# Patient Record
Sex: FEMALE | URBAN - NONMETROPOLITAN AREA
[De-identification: names, ages, dates, MRNs, and addresses within clinical notes are randomized per-mention and may not be internally consistent; named-entity substitution may affect disease eponyms.]

---

## 2020-07-30 ENCOUNTER — PROCEDURE VISIT (OUTPATIENT)
Dept: NEUROLOGY | Age: 60
End: 2020-07-30
Payer: COMMERCIAL

## 2020-07-30 PROCEDURE — 95886 MUSC TEST DONE W/N TEST COMP: CPT | Performed by: PSYCHIATRY & NEUROLOGY

## 2020-07-30 PROCEDURE — 95910 NRV CNDJ TEST 7-8 STUDIES: CPT | Performed by: PSYCHIATRY & NEUROLOGY

## 2023-02-14 ENCOUNTER — OFFICE VISIT (OUTPATIENT)
Dept: RHEUMATOLOGY | Age: 63
End: 2023-02-14
Payer: COMMERCIAL

## 2023-02-14 VITALS
DIASTOLIC BLOOD PRESSURE: 76 MMHG | BODY MASS INDEX: 35.16 KG/M2 | HEIGHT: 65 IN | WEIGHT: 211 LBS | OXYGEN SATURATION: 95 % | SYSTOLIC BLOOD PRESSURE: 126 MMHG | HEART RATE: 53 BPM

## 2023-02-14 DIAGNOSIS — M05.9 SEROPOSITIVE RHEUMATOID ARTHRITIS (HCC): Primary | ICD-10-CM

## 2023-02-14 DIAGNOSIS — Z79.899 ENCOUNTER FOR LONG-TERM (CURRENT) USE OF HIGH-RISK MEDICATION: ICD-10-CM

## 2023-02-14 PROCEDURE — 99204 OFFICE O/P NEW MOD 45 MIN: CPT | Performed by: INTERNAL MEDICINE

## 2023-02-14 RX ORDER — BACLOFEN 10 MG/1
10 TABLET ORAL 3 TIMES DAILY
COMMUNITY

## 2023-02-14 RX ORDER — PREDNISONE 1 MG/1
TABLET ORAL
Qty: 30 TABLET | Refills: 0 | Status: SHIPPED | OUTPATIENT
Start: 2023-02-14 | End: 2023-03-01

## 2023-02-14 RX ORDER — LEFLUNOMIDE 20 MG/1
20 TABLET ORAL DAILY
Qty: 30 TABLET | Refills: 1 | Status: SHIPPED | OUTPATIENT
Start: 2023-02-14 | End: 2023-03-16

## 2023-02-14 RX ORDER — METOPROLOL TARTRATE 50 MG/1
50 TABLET, FILM COATED ORAL 2 TIMES DAILY
COMMUNITY

## 2023-02-14 RX ORDER — PROPAFENONE HYDROCHLORIDE 150 MG/1
150 TABLET, FILM COATED ORAL EVERY 8 HOURS
COMMUNITY

## 2023-02-14 RX ORDER — ROPINIROLE 0.25 MG/1
0.25 TABLET, FILM COATED ORAL 3 TIMES DAILY
COMMUNITY

## 2023-02-14 ASSESSMENT — JOINT PAIN
TOTAL NUMBER OF TENDER JOINTS: 14
TOTAL NUMBER OF SWOLLEN JOINTS: 9

## 2023-02-14 ASSESSMENT — ENCOUNTER SYMPTOMS
SHORTNESS OF BREATH: 0
ABDOMINAL PAIN: 0
COUGH: 0
BLOOD IN STOOL: 0
VOMITING: 0
NAUSEA: 0

## 2023-02-14 NOTE — PROGRESS NOTES
[facility]) Physicians   Rheumatology Clinic Note      2/14/2023       Reason for Consult:  rheumatoid arthritis  Requesting Physician:  Tuyet Grimm RN     CHIEF COMPLAINT:    Chief Complaint   Patient presents with    New Patient     New pt pain in joints    Pt stated pain 8/10 all over           HISTORY OF PRESENT ILLNESS:    58 y.o. female presents today for evaluation of joint pain. Reports being tested positive for rheumatoid arthritis. Reports having joint pain for many years. Pain is most pronounced upon awakening in the morning. Swelling in joints. Pain is in the hands, wrist, knees. Has excessive fatigue. Muscle pain. Past Medical History:     has a past medical history of Arthritis. Past Surgical History:     has a past surgical history that includes knee surgery. Current Medications:      Current Outpatient Medications:     rOPINIRole (REQUIP) 0.25 MG tablet, Take 0.25 mg by mouth 3 times daily, Disp: , Rfl:     baclofen (LIORESAL) 10 MG tablet, Take 10 mg by mouth 3 times daily, Disp: , Rfl:     propafenone (RYTHMOL) 150 MG tablet, Take 150 mg by mouth in the morning and 150 mg at noon and 150 mg in the evening., Disp: , Rfl:     metoprolol tartrate (LOPRESSOR) 50 MG tablet, Take 50 mg by mouth 2 times daily, Disp: , Rfl:     leflunomide (ARAVA) 20 MG tablet, Take 1 tablet by mouth daily, Disp: 30 tablet, Rfl: 1    predniSONE (DELTASONE) 5 MG tablet, Take 3 tablets by mouth daily for 5 days, THEN 2 tablets daily for 5 days, THEN 1 tablet daily for 5 days. , Disp: 30 tablet, Rfl: 0    Allergies:    Patient has no known allergies. Social History:     reports that she quit smoking about 13 months ago. Her smoking use included cigarettes. She has never used smokeless tobacco. She reports that she does not currently use alcohol. Family History:   family history is not on file. REVIEW OF SYSTEMS:    Review of Systems   Constitutional:  Positive for fatigue.  Negative for chills, fever and unexpected weight change. HENT:  Negative for mouth sores. Respiratory:  Negative for cough and shortness of breath. Cardiovascular:  Negative for chest pain and leg swelling. Gastrointestinal:  Negative for abdominal pain, blood in stool, nausea and vomiting. Skin:  Negative for rash. Neurological:  Negative for headaches. PHYSICAL EXAM:    Vitals:    /76 (Site: Left Upper Arm, Position: Sitting, Cuff Size: Large Adult)   Pulse 53   Ht 5' 5\" (1.651 m)   Wt 211 lb (95.7 kg)   SpO2 95%   BMI 35.11 kg/m²     Physical Exam  Constitutional:       General: She is not in acute distress. Appearance: Normal appearance. HENT:      Mouth/Throat:      Mouth: Mucous membranes are moist.      Pharynx: Oropharynx is clear. Eyes:      Conjunctiva/sclera: Conjunctivae normal.   Cardiovascular:      Rate and Rhythm: Normal rate and regular rhythm. Heart sounds: Normal heart sounds. No murmur heard. No friction rub. Pulmonary:      Effort: Pulmonary effort is normal. No respiratory distress. Breath sounds: Normal breath sounds. No wheezing or rhonchi. Musculoskeletal:         General: Swelling and tenderness present. No deformity. Normal range of motion. Cervical back: Normal range of motion and neck supple. Right lower leg: No edema. Left lower leg: No edema. Lymphadenopathy:      Cervical: No cervical adenopathy. Skin:     General: Skin is warm and dry. Findings: No lesion or rash. Neurological:      General: No focal deficit present. Mental Status: She is alert and oriented to person, place, and time.       Gait: Gait normal.   Psychiatric:         Mood and Affect: Mood normal.      Physical Exam        LOO-28 (ESR): 5.37 (High disease activity)  LOO-28 (CRP): 4.45 (Moderate disease activity)         DATA:    RHEUMATOID FACTOR 0 - 13 IU/mL 320 High     Resulting [facility] [place] [facility] [place]   Specimen Collected: 02/03/23 10:37 Last Resulted: 02/04/23 19:54      Ref Range & Units 11 d ago Comments   Cyclic Citrullinated Peptide Ab, IgG 0.0 - 7.0 U/mL > 340.0 High   Reference Range:       <7.0  Negative   7.0-10.0  Equivocal      >10.0  Positive     [facility] [address] [facility], [address] [place] [address] ([address]    Specimen Collected: 02/03/23 10:37 Last Resulted: 02/08/23 15:06      Ref Range & Units 11 d ago   ESR (SED RATE, ERYTHROCYTE SEDIMENTATION RATE) 0 - 30 mm/hr 27    Resulting Agency  [address] [facility] [place]   Specimen Collected: 02/03/23 10:37 Last Resulted: 02/03/23 14:01     Ref Range & Units 11 d ago    C-REACTIVE PROTEIN 0.0 - 1.0 mg/dL 2.3 High     Resulting Agency  [facility]     FROILAN, IFA None Detected None Detected      Ref Range & Units 11 d ago Comments    SODIUM 136 - 145 mEq/L 137     POTASSIUM 3.5 - 5.1 mEq/L 4.5     CHLORIDE 98 - 107 mEq/L 106     CARBON DIOXIDE (CO2) 22 - 31 mEq/L 25     Glucose 70 - 126 mg/dL 92     BUN 7 - 22 mg/dL 20     CREATININE SERUM 0.4 - 1.1 mg/dL 0.7     ESTIMATED GFR  >=60      CALCIUM 8.4 - 10.2 mg/dL 8.8     AST 10 - 42 U/L 23     ALT 7 - 35 U/L 28     ALKALINE PHOSPHATASE 38 - 126 U/L 79     BILIRUBIN, TOTAL 0.3 - 1.2 mg/dL 0.2 Low      PROTEIN, TOTAL 6.4 - 8.3 g/dL 6.3 Low      Albumin 3.5 - 5.0 g/dL 3.5     Globulin 2.9 - 3.3 g/dL 2.8 Low      A/G Ratio 1.2 - 1.5 1.3     Resulting Agency  [address] [facility] [place]    Specimen Collected: 02/03/23 10:37 Last Resulted: 02/03/23 13:07      Ref Range & Units 11 d ago   WBC (WHITE BLOOD COUNT) 4.8 - 10.8 thou/cumm 8.3    RBC 4.20 - 5.40 mil/cumm 4.26    HEMOGLOBIN (HGB) 12.0 - 16.0 g/dL 12.7    HEMATOCRIT (HCT) 37.0 - 47.0 % 36.3 Low     MEAN CELL VOLUME 81.0 - 99.0 fL 85.1    Mean Cell HGB 28.0 - 32.0 pg 29.7    MEAN CELL HGB CONCENTRATION 33.0 - 37.0 g/dL 35.0    RBC DISTRIBUTION 11.5 - 14.5 % 13.6    PLATELET COUNT 247 - 400 thou/cumm 318    NEUTROPHILS 39.4 - 72.5 % 55.1    LYMPHOCYTE 17.6 - 49.6 % 31.2    MONOCYTES: 4.1 - 12.4 % 9.2    EOSINOPHIL % 0.0 - 4.0 % 3.0    BASOPHIL % 0.0 - 3.0 % 1.5    SCAN SLIDE NO NO    Resulting Agency  [facility] [place]           IMPRESSION/RECOMMENDATIONS:      1. Seropositive rheumatoid arthritis (+RF, +CCP): with high disease activity. -- start leflunomide 20 mg daily. Discussed with pt the benefits, risks and adverse effects of this medication. Patient expressed understanding.  -- prednsione taper as bridging therapy. 30 mg daily x 5 days, then 20 mg daily x 5 days, then 10 mg daily x 5 days then stop    2.  High risk med requiring close monitoring:  -- LEF: monitor CBC and CMP every 4-6 weeks    Rtc in 6 weeks with labs        Orders Placed This Encounter   Procedures    CBC with Auto Differential     Standing Status:   Future     Standing Expiration Date:   8/14/2023    C-Reactive Protein     Standing Status:   Future     Standing Expiration Date:   2/14/2024    Sedimentation Rate     Standing Status:   Future     Standing Expiration Date:   2/14/2024    Comprehensive Metabolic Panel     Standing Status:   Future     Standing Expiration Date:   2/14/2024        Leopold Baas, MD    [address] [facility] [address] [place] [address]. [address]  [phone]

## 2023-03-09 DIAGNOSIS — Z79.899 ENCOUNTER FOR LONG-TERM (CURRENT) USE OF HIGH-RISK MEDICATION: ICD-10-CM

## 2023-03-09 DIAGNOSIS — M05.9 SEROPOSITIVE RHEUMATOID ARTHRITIS (HCC): ICD-10-CM

## 2023-03-09 RX ORDER — LEFLUNOMIDE 20 MG/1
TABLET ORAL
Qty: 30 TABLET | Refills: 1 | OUTPATIENT
Start: 2023-03-09

## 2023-03-22 LAB
ALBUMIN SERPL-MCNC: 3.8 G/DL (ref 3.5–5)
ALBUMIN/GLOBULIN RATIO: 1.2 (ref 1.2–1.5)
ALK PHOSPHATASE: 107 U/L (ref 38–126)
ALT SERPL-CCNC: 77 U/L (ref 7–35)
AST SERPL-CCNC: 53 U/L (ref 10–42)
BASOPHILS # BLD: 1 % (ref 0–3)
BILIRUB SERPL-MCNC: 0.6 MG/DL (ref 0.3–1.2)
BUN BLDV-MCNC: 13 MG/DL (ref 7–22)
C-REACTIVE PROTEIN: 1.3 MG/DL (ref 0–1)
CALCIUM SERPL-MCNC: 9.2 MG/DL (ref 8.4–10.2)
CHLORIDE BLD-SCNC: 98 MEQ/L (ref 98–107)
CO2: 28 MEQ/L (ref 22–31)
CREAT SERPL-MCNC: 0.8 MG/DL (ref 0.4–1.1)
EOSINOPHIL # BLD: 2.4 % (ref 0–4)
ERYTHROCYTE SEDIMENTATION RATE: 12 MM/HR (ref 0–30)
GFR SERPL CREATININE-BSD FRML MDRD: >=60 ML/MIN/{1.73_M2}
GLOBULIN: 3.3 G/DL (ref 2.9–3.3)
GLUCOSE: 99 MG/DL (ref 70–126)
HCT VFR BLD CALC: 43.1 % (ref 37–47)
HEMOGLOBIN: 14.9 G/DL (ref 12–16)
LYMPHOCYTES # BLD: 31.6 % (ref 17.6–49.6)
MCH RBC QN AUTO: 29.7 PG (ref 28–32)
MCHC RBC AUTO-ENTMCNC: 34.6 G/DL (ref 33–37)
MCV RBC AUTO: 85.9 FL (ref 81–99)
MONOCYTES # BLD: 10.8 % (ref 4.1–12.4)
PDW BLD-RTO: 13.7 % (ref 11.5–14.5)
PLATELET # BLD: 329 THOU/CUMM (ref 130–400)
POTASSIUM SERPL-SCNC: 3.7 MEQ/L (ref 3.5–5.1)
RBC: 5.01 MIL/CUMM (ref 4.2–5.4)
SCAN OF BLOOD SMEAR: NO
SEG NEUTROPHILS: 54.2 % (ref 39.4–72.5)
SODIUM BLD-SCNC: 136 MEQ/L (ref 136–145)
TOTAL PROTEIN: 7.1 G/DL (ref 6.4–8.3)
WBC: 8.3 THOU/CUMM (ref 4.8–10.8)

## 2023-03-23 ENCOUNTER — TELEPHONE (OUTPATIENT)
Dept: RHEUMATOLOGY | Age: 63
End: 2023-03-23

## 2023-03-23 DIAGNOSIS — Z11.1 SCREENING-PULMONARY TB: Primary | ICD-10-CM

## 2023-03-23 DIAGNOSIS — Z11.59 NEED FOR HEPATITIS B SCREENING TEST: ICD-10-CM

## 2023-03-23 NOTE — TELEPHONE ENCOUNTER
Please instruct pt to stop taking leflunomide. The labs that were done earlier showed high liver enzymes. I suspect this is related to the medication. We plan to monitor the liver enzymes and expect it to improve after stopping the offending medication. Please also inform pt that I would like to get some more blood tests prior to her upcoming visit. We need these test results in order to start other medications to treat rheumatoid arthritis that does not cause liver problems. Orders are in the chart. Thank you.

## 2023-03-28 ENCOUNTER — OFFICE VISIT (OUTPATIENT)
Dept: RHEUMATOLOGY | Age: 63
End: 2023-03-28
Payer: COMMERCIAL

## 2023-03-28 VITALS
DIASTOLIC BLOOD PRESSURE: 80 MMHG | BODY MASS INDEX: 35.15 KG/M2 | WEIGHT: 210.98 LBS | HEART RATE: 65 BPM | HEIGHT: 65 IN | SYSTOLIC BLOOD PRESSURE: 120 MMHG | OXYGEN SATURATION: 96 %

## 2023-03-28 DIAGNOSIS — R79.89 ELEVATED LFTS: ICD-10-CM

## 2023-03-28 DIAGNOSIS — Z79.899 ENCOUNTER FOR LONG-TERM (CURRENT) USE OF HIGH-RISK MEDICATION: ICD-10-CM

## 2023-03-28 DIAGNOSIS — R29.898 WEAKNESS OF BOTH LOWER EXTREMITIES: ICD-10-CM

## 2023-03-28 DIAGNOSIS — M05.9 SEROPOSITIVE RHEUMATOID ARTHRITIS (HCC): Primary | ICD-10-CM

## 2023-03-28 PROCEDURE — 99214 OFFICE O/P EST MOD 30 MIN: CPT | Performed by: INTERNAL MEDICINE

## 2023-03-28 RX ORDER — MELOXICAM 15 MG/1
TABLET ORAL
COMMUNITY
Start: 2023-03-02

## 2023-03-28 RX ORDER — LANOLIN ALCOHOL/MO/W.PET/CERES
250 CREAM (GRAM) TOPICAL NIGHTLY
COMMUNITY

## 2023-03-28 RX ORDER — METOPROLOL SUCCINATE 50 MG/1
TABLET, EXTENDED RELEASE ORAL
COMMUNITY
Start: 2023-03-07

## 2023-03-28 RX ORDER — APIXABAN 5 MG/1
5 TABLET, FILM COATED ORAL 2 TIMES DAILY
COMMUNITY
Start: 2022-12-19

## 2023-03-28 RX ORDER — MULTIVITAMIN WITH IRON
TABLET ORAL
COMMUNITY

## 2023-03-28 RX ORDER — ALBUTEROL SULFATE 2.5 MG/3ML
SOLUTION RESPIRATORY (INHALATION)
COMMUNITY
Start: 2023-03-23

## 2023-03-28 RX ORDER — HYDROXYCHLOROQUINE SULFATE 200 MG/1
200 TABLET, FILM COATED ORAL DAILY
Qty: 30 TABLET | Refills: 2 | Status: SHIPPED | OUTPATIENT
Start: 2023-03-28 | End: 2023-04-27

## 2023-03-28 RX ORDER — FLUTICASONE FUROATE, UMECLIDINIUM BROMIDE AND VILANTEROL TRIFENATATE 200; 62.5; 25 UG/1; UG/1; UG/1
1 POWDER RESPIRATORY (INHALATION) DAILY
COMMUNITY
Start: 2023-03-13

## 2023-03-28 ASSESSMENT — ENCOUNTER SYMPTOMS
VOMITING: 0
SHORTNESS OF BREATH: 0
ABDOMINAL PAIN: 0
NAUSEA: 0
COUGH: 0

## 2023-03-28 NOTE — PROGRESS NOTES
assess QFT TB and Hep B serologies for possible need to start biologic treatment. 2. Elevated LFTs. Likely related to leflunomide  -- will monitor closely and repeat testing in 5 weeks    3. Weakness in legs.  Possible deconditioning.  -- physical therapy was ordered    RTC in 6 weeks       Orders Placed This Encounter   Procedures    Hepatic Function Panel     Standing Status:   Future     Standing Expiration Date:   3/28/2024    CK     Standing Status:   Future     Standing Expiration Date:   9/28/2023    TSH with Reflex     Standing Status:   Future     Standing Expiration Date:   9/28/2023    Comprehensive Metabolic Panel     Standing Status:   Future     Standing Expiration Date:   3/28/2024    External Referral To Physical Therapy     Referral Priority:   Routine     Referral Type:   Eval and Treat     Requested Specialty:   Physical Therapist     Number of Visits Requested:   900 Madi Pan MD    [address] [facility]. [address] [place] [address]. [address] [facility]  [address] [place] [address]

## 2023-05-08 ENCOUNTER — PATIENT MESSAGE (OUTPATIENT)
Dept: RHEUMATOLOGY | Age: 63
End: 2023-05-08

## 2023-05-09 ENCOUNTER — TELEPHONE (OUTPATIENT)
Dept: RHEUMATOLOGY | Age: 63
End: 2023-05-09

## 2023-05-09 ENCOUNTER — OFFICE VISIT (OUTPATIENT)
Dept: RHEUMATOLOGY | Age: 63
End: 2023-05-09
Payer: COMMERCIAL

## 2023-05-09 VITALS
OXYGEN SATURATION: 96 % | DIASTOLIC BLOOD PRESSURE: 78 MMHG | SYSTOLIC BLOOD PRESSURE: 134 MMHG | WEIGHT: 219 LBS | HEIGHT: 65 IN | HEART RATE: 75 BPM | BODY MASS INDEX: 36.49 KG/M2

## 2023-05-09 DIAGNOSIS — R79.89 ELEVATED LFTS: ICD-10-CM

## 2023-05-09 DIAGNOSIS — Z11.59 NEED FOR HEPATITIS B SCREENING TEST: ICD-10-CM

## 2023-05-09 DIAGNOSIS — Z11.1 SCREENING-PULMONARY TB: ICD-10-CM

## 2023-05-09 DIAGNOSIS — Z79.899 ENCOUNTER FOR LONG-TERM (CURRENT) USE OF HIGH-RISK MEDICATION: ICD-10-CM

## 2023-05-09 DIAGNOSIS — M05.9 SEROPOSITIVE RHEUMATOID ARTHRITIS (HCC): Primary | ICD-10-CM

## 2023-05-09 PROCEDURE — 99214 OFFICE O/P EST MOD 30 MIN: CPT | Performed by: INTERNAL MEDICINE

## 2023-05-09 RX ORDER — PREDNISONE 10 MG/1
TABLET ORAL
Qty: 42 TABLET | Refills: 0 | Status: SHIPPED | OUTPATIENT
Start: 2023-05-09 | End: 2023-05-30

## 2023-05-09 ASSESSMENT — ENCOUNTER SYMPTOMS
SHORTNESS OF BREATH: 0
COUGH: 0
ABDOMINAL PAIN: 0
VOMITING: 0
NAUSEA: 0

## 2023-05-09 ASSESSMENT — JOINT PAIN
TOTAL NUMBER OF TENDER JOINTS: 17
TOTAL NUMBER OF SWOLLEN JOINTS: 9

## 2023-05-09 NOTE — PROGRESS NOTES
[facility]) Physicians   Rheumatology Clinic Note      5/9/2023       CHIEF COMPLAINT:    Chief Complaint   Patient presents with    Follow-up     6 week f/u Seropositive rheumatoid arthritis ([address]    Other     Pt feels medication has made her symptoms worse. Lt knee knee has bakers cyst, she feels like it has bursted. Pt pin is a 10 when she is ambulating. 2 wks ago a rash has appeared on abdomen. Pt started noticing hair loss. HISTORY OF PRESENT ILLNESS:    58 y.o. female with recent diagnosis of seropositive rheumatoid arthritis (+RF, +CCP) presents for follow up. Presently, she is on hydroxychloroquine 200 mg daily (started last visit). Past med: leflunomide (elevated LFTs). Reports significant worsening in her joint pain since starting hydroxychloroquine. Also reports having pruritic skin rash in her abdomen that started shortly after starting PLQ. Pain is most pronounced in her left knee, associated with swelling. Also has pain in her hands, wrists, shoulders, ankles and feet. Morning stiffness lasts over an hour. Having difficulty ambulating due to pain and stiffness. Was evaluated in ED recently for swelling in the left leg. DVT was ruled out and was informed that she has a Baker cyst.      Past Medical History:     has a past medical history of Arthritis. Past Surgical History:     has a past surgical history that includes knee surgery. Current Medications:      Current Outpatient Medications:     predniSONE (DELTASONE) 10 MG tablet, Take 3 tablets by mouth daily for 7 days, THEN 2 tablets daily for 7 days, THEN 1 tablet daily for 7 days. , Disp: 42 tablet, Rfl: 0    albuterol (PROVENTIL) (2.5 MG/3ML) 0.083% nebulizer solution, , Disp: , Rfl:     ELIQUIS 5 MG TABS tablet, Take 1 tablet by mouth 2 times daily, Disp: , Rfl:     vitamin D3 (CHOLECALCIFEROL) 125 MCG (5000 UT) TABS tablet, Take 1 tablet by mouth daily, Disp: , Rfl:     fluticasone-umeclidin-vilant (Hilary Salcedo)

## 2023-05-15 RX ORDER — ADALIMUMAB 40MG/0.4ML
40 KIT SUBCUTANEOUS
Qty: 2 EACH | Refills: 5 | Status: ACTIVE | OUTPATIENT
Start: 2023-05-15

## 2023-05-15 NOTE — TELEPHONE ENCOUNTER
QFT TB and Hep B serologies are negative. Humira was e-prescribed today 5/15/23. Will follow up on PA.

## 2023-05-16 NOTE — TELEPHONE ENCOUNTER
Spoke with pt over the phone and addressed her concerns. She is more reassured now and understands why we are starting with Humira. The risks and benefits of this medication, as well as the potential complications of uncontrolled systemic inflammatory conditions -RA, were discussed with her. She is in agreement to start Humira.

## 2023-05-16 NOTE — TELEPHONE ENCOUNTER
Humira PA has been approved, copay is $3330 for 1 month. Called left  for pt. Free drug nargis needs started.

## 2023-05-27 DIAGNOSIS — Z79.899 ENCOUNTER FOR LONG-TERM (CURRENT) USE OF HIGH-RISK MEDICATION: ICD-10-CM

## 2023-05-27 DIAGNOSIS — M05.9 SEROPOSITIVE RHEUMATOID ARTHRITIS (HCC): ICD-10-CM

## 2023-05-30 RX ORDER — PREDNISONE 10 MG/1
TABLET ORAL
Qty: 42 TABLET | Refills: 0 | OUTPATIENT
Start: 2023-05-30

## 2023-07-04 ENCOUNTER — PATIENT MESSAGE (OUTPATIENT)
Dept: RHEUMATOLOGY | Age: 63
End: 2023-07-04

## 2023-07-04 DIAGNOSIS — M25.562 CHRONIC PAIN OF LEFT KNEE: Primary | ICD-10-CM

## 2023-07-04 DIAGNOSIS — G89.29 CHRONIC PAIN OF LEFT KNEE: Primary | ICD-10-CM

## 2023-07-05 NOTE — TELEPHONE ENCOUNTER
From: Yury Aikns  To: Dr. Reece Maxwell: 7/4/2023 10:08 AM EDT  Subject: Left knee/leg    Greetings Doctor Bill Pelaez  I apologize for disturbing you on a holiday however my left leg is getting no better on the humira. It is swollen and extremely painful. I cannot put any weight on it. I was diagnosed with a Baker's Cyst in April (?). I was wondering if I should go into the clinic to have it looked at.  Or is this my life now lol

## 2023-07-08 ENCOUNTER — HOSPITAL ENCOUNTER (OUTPATIENT)
Age: 63
Discharge: HOME | End: 2023-07-08
Payer: COMMERCIAL

## 2023-07-08 ENCOUNTER — HOSPITAL ENCOUNTER (OUTPATIENT)
Dept: GENERAL RADIOLOGY | Age: 63
Discharge: HOME | End: 2023-07-08
Attending: INTERNAL MEDICINE
Payer: COMMERCIAL

## 2023-07-08 DIAGNOSIS — M25.562 CHRONIC PAIN OF LEFT KNEE: ICD-10-CM

## 2023-07-08 DIAGNOSIS — G89.29 CHRONIC PAIN OF LEFT KNEE: ICD-10-CM

## 2023-07-08 PROCEDURE — 73562 X-RAY EXAM OF KNEE 3: CPT

## 2023-07-10 ENCOUNTER — OFFICE VISIT (OUTPATIENT)
Dept: RHEUMATOLOGY | Age: 63
End: 2023-07-10

## 2023-07-10 VITALS
SYSTOLIC BLOOD PRESSURE: 112 MMHG | HEART RATE: 74 BPM | DIASTOLIC BLOOD PRESSURE: 74 MMHG | HEIGHT: 65 IN | WEIGHT: 221.2 LBS | BODY MASS INDEX: 36.85 KG/M2 | OXYGEN SATURATION: 93 %

## 2023-07-10 DIAGNOSIS — M25.562 CHRONIC PAIN OF LEFT KNEE: ICD-10-CM

## 2023-07-10 DIAGNOSIS — G89.29 CHRONIC PAIN OF LEFT KNEE: ICD-10-CM

## 2023-07-10 DIAGNOSIS — M05.9 SEROPOSITIVE RHEUMATOID ARTHRITIS (HCC): Primary | ICD-10-CM

## 2023-07-10 RX ORDER — METHYLPREDNISOLONE ACETATE 80 MG/ML
80 INJECTION, SUSPENSION INTRA-ARTICULAR; INTRALESIONAL; INTRAMUSCULAR; SOFT TISSUE ONCE
Status: COMPLETED | OUTPATIENT
Start: 2023-07-10 | End: 2023-07-10

## 2023-07-10 RX ADMIN — METHYLPREDNISOLONE ACETATE 80 MG: 80 INJECTION, SUSPENSION INTRA-ARTICULAR; INTRALESIONAL; INTRAMUSCULAR; SOFT TISSUE at 15:22

## 2023-07-10 ASSESSMENT — ENCOUNTER SYMPTOMS
VOMITING: 0
COUGH: 0
ABDOMINAL PAIN: 0
SHORTNESS OF BREATH: 0
NAUSEA: 0

## 2023-07-10 NOTE — PROGRESS NOTES
[facility]) Physicians   Rheumatology Clinic Note      7/11/2023       CHIEF COMPLAINT:    Chief Complaint   Patient presents with    Follow-up     2 month f/u Seropositive rheumatoid arthritis (HCC)Seropositive rheumatoid arthritis ([address])    Other     Pt has pain and inflammation in Lt knee. Stated she has a bakers cyst dx in April. She does have trouble walking and bending. HISTORY OF PRESENT ILLNESS:    61 y.o. female with seropositive rheumatoid arthritis (+RF, +CCP) presents for follow up. Presently, she is on Humira 40 mg SQ every 2 weeks (started May 2023). Past med: leflunomide (elevated LFTs), hydroxychloroquine (rash/ineffective)    Her main concern is persistent pain and swelling in the left knee over past several months. Walking aggravates the pain. Unaware of any relieving factors. No other joint pain or joint swelling. No prolonged morning stiffness. Was evaluated in ED recently for swelling in the left leg. DVT was ruled out and was informed that she has a Baker cyst.      Past Medical History:     has a past medical history of Arthritis. Past Surgical History:     has a past surgical history that includes knee surgery.     Current Medications:      Current Outpatient Medications:     Adalimumab (HUMIRA PEN) 40 MG/0.4ML PNKT, Inject 40 mg into the skin every 14 days, Disp: 2 each, Rfl: 5    albuterol (PROVENTIL) (2.5 MG/3ML) 0.083% nebulizer solution, , Disp: , Rfl:     ELIQUIS 5 MG TABS tablet, Take 1 tablet by mouth 2 times daily, Disp: , Rfl:     vitamin D3 (CHOLECALCIFEROL) 125 MCG (5000 UT) TABS tablet, Take 1 tablet by mouth daily, Disp: , Rfl:     fluticasone-umeclidin-vilant (TRELEGY ELLIPTA) 200-62.5-25 MCG/ACT AEPB inhaler, Inhale 1 puff into the lungs daily, Disp: , Rfl:     meloxicam (MOBIC) 15 MG tablet, TAKE 1 TABLET BY MOUTH EVERY DAY FOR 30 DAYS, Disp: , Rfl:     metoprolol succinate (TOPROL XL) 50 MG extended release tablet, , Disp: , Rfl:     niacin 250 MG

## 2023-07-11 ASSESSMENT — JOINT PAIN
TOTAL NUMBER OF TENDER JOINTS: 1
TOTAL NUMBER OF SWOLLEN JOINTS: 1

## 2023-08-02 DIAGNOSIS — G89.29 CHRONIC PAIN OF LEFT KNEE: ICD-10-CM

## 2023-08-02 DIAGNOSIS — M25.562 CHRONIC PAIN OF LEFT KNEE: ICD-10-CM

## 2023-10-25 DIAGNOSIS — M05.9 SEROPOSITIVE RHEUMATOID ARTHRITIS (HCC): ICD-10-CM

## 2023-10-25 RX ORDER — ADALIMUMAB 40MG/0.4ML
KIT SUBCUTANEOUS
Qty: 2 EACH | Refills: 5 | Status: ACTIVE | OUTPATIENT
Start: 2023-10-25

## 2023-10-26 ENCOUNTER — OFFICE VISIT (OUTPATIENT)
Dept: RHEUMATOLOGY | Age: 63
End: 2023-10-26
Payer: COMMERCIAL

## 2023-10-26 VITALS
HEART RATE: 72 BPM | HEIGHT: 65 IN | SYSTOLIC BLOOD PRESSURE: 134 MMHG | OXYGEN SATURATION: 96 % | WEIGHT: 216.8 LBS | BODY MASS INDEX: 36.12 KG/M2 | DIASTOLIC BLOOD PRESSURE: 74 MMHG

## 2023-10-26 DIAGNOSIS — M05.9 SEROPOSITIVE RHEUMATOID ARTHRITIS (HCC): Primary | ICD-10-CM

## 2023-10-26 PROCEDURE — 99214 OFFICE O/P EST MOD 30 MIN: CPT | Performed by: INTERNAL MEDICINE

## 2023-10-26 RX ORDER — BUPROPION HYDROCHLORIDE 150 MG/1
150 TABLET ORAL EVERY MORNING
COMMUNITY
Start: 2023-10-24

## 2023-10-26 RX ORDER — ERGOCALCIFEROL 1.25 MG/1
CAPSULE ORAL
COMMUNITY
Start: 2023-10-25

## 2023-10-26 ASSESSMENT — ENCOUNTER SYMPTOMS
NAUSEA: 0
VOMITING: 0
SHORTNESS OF BREATH: 0
COUGH: 0
ABDOMINAL PAIN: 0

## 2023-10-26 ASSESSMENT — JOINT PAIN
TOTAL NUMBER OF SWOLLEN JOINTS: 0
TOTAL NUMBER OF TENDER JOINTS: 0

## 2023-10-26 NOTE — PROGRESS NOTES
LFTs.    2. Left knee pain, with large Baker cyst and OA of the knee. She is following with orthopedics with plans for arthroscopic surgery pending cardiac clearance. RTC in 4 months      No orders of the defined types were placed in this encounter.          Ronna Foy MD    [address]  [address] [place] [address]  [phone]

## 2024-01-22 ENCOUNTER — TELEPHONE (OUTPATIENT)
Dept: RHEUMATOLOGY | Age: 64
End: 2024-01-22

## 2024-01-22 NOTE — TELEPHONE ENCOUNTER
Patient was last seen 10/26/2023 and her next appt is 3/20/2024 with Dr Kramer.  She is on humira but has now lost her prescription coverage. She said she has a $5300 deductible, but she can not afford to pay that.  Please call her with any advice/recommendations for the humira.

## 2024-01-22 NOTE — TELEPHONE ENCOUNTER
Pt has been notified and she stated she gets a co pay card for $3500 and if her medication is not covered once she refills she will give the office a call.

## 2024-03-14 DIAGNOSIS — M05.9 SEROPOSITIVE RHEUMATOID ARTHRITIS (HCC): ICD-10-CM

## 2024-03-14 RX ORDER — ADALIMUMAB 40MG/0.4ML
KIT SUBCUTANEOUS
Qty: 2 EACH | Refills: 5 | Status: ACTIVE | OUTPATIENT
Start: 2024-03-14

## 2024-03-20 ENCOUNTER — OFFICE VISIT (OUTPATIENT)
Dept: RHEUMATOLOGY | Age: 64
End: 2024-03-20
Payer: COMMERCIAL

## 2024-03-20 VITALS
HEIGHT: 65 IN | BODY MASS INDEX: 36.32 KG/M2 | WEIGHT: 218 LBS | OXYGEN SATURATION: 97 % | SYSTOLIC BLOOD PRESSURE: 110 MMHG | HEART RATE: 62 BPM | DIASTOLIC BLOOD PRESSURE: 84 MMHG

## 2024-03-20 DIAGNOSIS — M05.9 SEROPOSITIVE RHEUMATOID ARTHRITIS (HCC): Primary | ICD-10-CM

## 2024-03-20 DIAGNOSIS — M79.10 MYALGIA: ICD-10-CM

## 2024-03-20 PROCEDURE — 99214 OFFICE O/P EST MOD 30 MIN: CPT | Performed by: INTERNAL MEDICINE

## 2024-03-20 RX ORDER — DULOXETIN HYDROCHLORIDE 30 MG/1
30 CAPSULE, DELAYED RELEASE ORAL DAILY
Qty: 30 CAPSULE | Refills: 3 | Status: SHIPPED | OUTPATIENT
Start: 2024-03-20

## 2024-03-20 RX ORDER — ROPINIROLE 0.25 MG/1
0.25 TABLET, FILM COATED ORAL NIGHTLY PRN
COMMUNITY
Start: 2024-02-20

## 2024-03-20 NOTE — PROGRESS NOTES
[facility] Physicians   Rheumatology Clinic Note      3/20/2024       CHIEF COMPLAINT:    Chief Complaint   Patient presents with    Follow-up     4 month f/u seropositive rheumatoid arthritis  She is having pain in the bottoms of feet and ankles. she is still stiff when she gets up.  She is taking Meloxicam for her pain and she is now out.            HISTORY OF PRESENT ILLNESS:    63 y.o. female with seropositive rheumatoid arthritis (+RF, +CCP) presents for follow up. Presently, she is on Humira 40 mg SQ every 2 weeks (started May 2023).  Past med: leflunomide (elevated LFTs), hydroxychloroquine (rash/ineffective)    Pain is mainly in her feet. Worsens with walking. Reports having deformities in her toes, bunion, and is scheduled to see orthopedics.  Had arthroscopic procedure for large Baker cyst in the right knee, this helped her pain significantly.    Otherwise, no other joint pain, joint swelling.  Morning stiffness lasts for an hour.      Past Medical History:     has a past medical history of Arthritis.    Past Surgical History:     has a past surgical history that includes knee surgery.    Current Medications:      Current Outpatient Medications:     rOPINIRole (REQUIP) 0.25 MG tablet, Take 1 tablet by mouth nightly as needed, Disp: , Rfl:     adalimumab (HUMIRA, 2 PEN,) 40 MG/0.4ML PNKT, INJECT 40MG UNDER THE SKIN EVERY 14 DAYS, Disp: 2 each, Rfl: 5    buPROPion (WELLBUTRIN XL) 150 MG extended release tablet, Take 2 tablets by mouth every morning, Disp: , Rfl:     vitamin D (ERGOCALCIFEROL) 1.25 MG (36738 UT) CAPS capsule, , Disp: , Rfl:     albuterol (PROVENTIL) (2.5 MG/3ML) 0.083% nebulizer solution, , Disp: , Rfl:     ELIQUIS 5 MG TABS tablet, Take 1 tablet by mouth 2 times daily, Disp: , Rfl:     fluticasone-umeclidin-vilant (TRELEGY ELLIPTA) 200-62.5-25 MCG/ACT AEPB inhaler, Inhale 1 puff into the lungs daily, Disp: , Rfl:     meloxicam (MOBIC) 15 MG tablet, TAKE 1 TABLET BY MOUTH EVERY DAY FOR 30

## 2024-06-20 ENCOUNTER — OFFICE VISIT (OUTPATIENT)
Dept: RHEUMATOLOGY | Age: 64
End: 2024-06-20
Payer: COMMERCIAL

## 2024-06-20 VITALS
OXYGEN SATURATION: 98 % | BODY MASS INDEX: 36.32 KG/M2 | SYSTOLIC BLOOD PRESSURE: 110 MMHG | WEIGHT: 218 LBS | HEIGHT: 65 IN | DIASTOLIC BLOOD PRESSURE: 76 MMHG | HEART RATE: 74 BPM

## 2024-06-20 DIAGNOSIS — M05.9 SEROPOSITIVE RHEUMATOID ARTHRITIS (HCC): Primary | ICD-10-CM

## 2024-06-20 PROCEDURE — 99214 OFFICE O/P EST MOD 30 MIN: CPT | Performed by: INTERNAL MEDICINE

## 2024-06-20 ASSESSMENT — ENCOUNTER SYMPTOMS
COUGH: 0
VOMITING: 0
SHORTNESS OF BREATH: 0
NAUSEA: 0
ABDOMINAL PAIN: 0

## 2024-06-20 NOTE — PROGRESS NOTES
Seropositive rheumatoid arthritis (+RF, +CCP): well controlled at this time with current medication regimen.  -- continue Humira 40 mg SQ every 2 weeks  -- MTX and SSZ are C/I due to elevated LFTs.      RTC in 3 months      No orders of the defined types were placed in this encounter.         Safia Kramer MD    [facility] RHEUMATOLOGY  [address] [address] [phone]

## 2024-08-19 DIAGNOSIS — M05.9 SEROPOSITIVE RHEUMATOID ARTHRITIS (HCC): ICD-10-CM

## 2024-08-19 RX ORDER — ADALIMUMAB 40MG/0.4ML
KIT SUBCUTANEOUS
Qty: 2 EACH | Refills: 5 | Status: ACTIVE | OUTPATIENT
Start: 2024-08-19

## 2024-11-18 ENCOUNTER — OFFICE VISIT (OUTPATIENT)
Dept: RHEUMATOLOGY | Age: 64
End: 2024-11-18
Payer: COMMERCIAL

## 2024-11-18 VITALS
SYSTOLIC BLOOD PRESSURE: 114 MMHG | DIASTOLIC BLOOD PRESSURE: 68 MMHG | HEIGHT: 65 IN | WEIGHT: 218 LBS | OXYGEN SATURATION: 96 % | HEART RATE: 78 BPM | BODY MASS INDEX: 36.32 KG/M2

## 2024-11-18 DIAGNOSIS — M79.10 MYALGIA: ICD-10-CM

## 2024-11-18 DIAGNOSIS — M05.9 SEROPOSITIVE RHEUMATOID ARTHRITIS (HCC): Primary | ICD-10-CM

## 2024-11-18 PROCEDURE — 99214 OFFICE O/P EST MOD 30 MIN: CPT | Performed by: INTERNAL MEDICINE

## 2024-11-18 RX ORDER — VENLAFAXINE HYDROCHLORIDE 75 MG/1
75 CAPSULE, EXTENDED RELEASE ORAL DAILY
Qty: 30 CAPSULE | Refills: 3 | Status: SHIPPED | OUTPATIENT
Start: 2024-11-18

## 2024-11-18 ASSESSMENT — ENCOUNTER SYMPTOMS
COUGH: 0
ABDOMINAL PAIN: 0
SHORTNESS OF BREATH: 0
NAUSEA: 0
VOMITING: 0

## 2024-11-18 NOTE — PROGRESS NOTES
(12877 UT) CAPS capsule, , Disp: , Rfl:     albuterol (PROVENTIL) (2.5 MG/3ML) 0.083% nebulizer solution, , Disp: , Rfl:     ELIQUIS 5 MG TABS tablet, Take 1 tablet by mouth 2 times daily, Disp: , Rfl:     fluticasone-umeclidin-vilant (TRELEGY ELLIPTA) 200-62.5-25 MCG/ACT AEPB inhaler, Inhale 1 puff into the lungs daily, Disp: , Rfl:     propafenone (RYTHMOL) 150 MG tablet, Take 1 tablet by mouth in the morning and 1 tablet at noon and 1 tablet in the evening., Disp: , Rfl:     metoprolol tartrate (LOPRESSOR) 50 MG tablet, Take 1 tablet by mouth 2 times daily, Disp: , Rfl:     Allergies:    Meperidine    Social History:     reports that she quit smoking about 2 years ago. Her smoking use included cigarettes. She has never used smokeless tobacco. She reports that she does not currently use alcohol. She reports current drug use. Drug: Marijuana (Weed).    Family History:   family history includes Colon Cancer in her father; Rheum Arthritis in her mother and sister.      REVIEW OF SYSTEMS:    Review of Systems   Constitutional:  Negative for chills and fever.   Respiratory:  Negative for cough and shortness of breath.    Cardiovascular:  Negative for chest pain.   Gastrointestinal:  Negative for abdominal pain, nausea and vomiting.   Skin:  Negative for rash.          PHYSICAL EXAM:    Vitals:    /68 (Site: Left Upper Arm, Position: Sitting, Cuff Size: Medium Adult)   Pulse 78   Ht 1.651 m (5' 5\")   Wt 98.9 kg (218 lb)   SpO2 96%   BMI 36.28 kg/m²     Physical Exam  Constitutional:       General: She is not in acute distress.     Appearance: Normal appearance.   Eyes:      Conjunctiva/sclera: Conjunctivae normal.   Pulmonary:      Effort: Pulmonary effort is normal.   Musculoskeletal:         General: Deformity present. No swelling or tenderness.      Comments: Left foot in support boot   Skin:     General: Skin is warm and dry.      Findings: No rash.   Neurological:      General: No focal deficit present.

## 2024-11-18 NOTE — PATIENT INSTRUCTIONS
Plan to start Effexor 75 mg daily for treatment of muscle pain (fibromyalgia). Discuss with you primary care provider. Possible weaning off Welbutrin.

## 2025-03-03 ENCOUNTER — TELEPHONE (OUTPATIENT)
Age: 65
End: 2025-03-03

## 2025-03-03 DIAGNOSIS — M05.9 SEROPOSITIVE RHEUMATOID ARTHRITIS (HCC): ICD-10-CM

## 2025-03-03 DIAGNOSIS — M05.9 SEROPOSITIVE RHEUMATOID ARTHRITIS (HCC): Primary | ICD-10-CM

## 2025-03-03 RX ORDER — ADALIMUMAB 40MG/0.4ML
KIT SUBCUTANEOUS
Qty: 2 EACH | Refills: 5 | Status: SHIPPED | OUTPATIENT
Start: 2025-03-03 | End: 2025-03-03

## 2025-03-03 NOTE — TELEPHONE ENCOUNTER
----- Message from BELKYS CACERES MA sent at 3/3/2025  8:48 AM EST -----  Regarding: FW: Humira    ----- Message -----  From: Stewart Becker  Sent: 3/3/2025   8:43 AM EST  To: [facility]  Subject: Alexandria Ramos has been approved thru 12/06/25 - can I have a new script sent for the pens (pnkt) instead of the syringes. I show that's what she has been getting filled     Thanks so much - J

## 2025-03-04 ENCOUNTER — OFFICE VISIT (OUTPATIENT)
Age: 65
End: 2025-03-04
Payer: COMMERCIAL

## 2025-03-04 VITALS
BODY MASS INDEX: 36.4 KG/M2 | OXYGEN SATURATION: 94 % | HEIGHT: 65 IN | SYSTOLIC BLOOD PRESSURE: 124 MMHG | WEIGHT: 218.5 LBS | DIASTOLIC BLOOD PRESSURE: 82 MMHG | HEART RATE: 69 BPM

## 2025-03-04 DIAGNOSIS — M79.10 MYALGIA: ICD-10-CM

## 2025-03-04 DIAGNOSIS — E55.9 VITAMIN D DEFICIENCY: ICD-10-CM

## 2025-03-04 DIAGNOSIS — M05.9 SEROPOSITIVE RHEUMATOID ARTHRITIS (HCC): Primary | ICD-10-CM

## 2025-03-04 PROCEDURE — 99214 OFFICE O/P EST MOD 30 MIN: CPT | Performed by: INTERNAL MEDICINE

## 2025-03-04 RX ORDER — AMITRIPTYLINE HYDROCHLORIDE 10 MG/1
10 TABLET ORAL NIGHTLY
COMMUNITY

## 2025-03-04 ASSESSMENT — ENCOUNTER SYMPTOMS
VOMITING: 0
NAUSEA: 0
COUGH: 0
SHORTNESS OF BREATH: 0
ABDOMINAL PAIN: 0

## 2025-03-04 NOTE — PROGRESS NOTES
11 d ago   Cyclic Citrullinated Peptide Ab, IgG 0.0 - 7.0 U/mL > 340.0 High     Specimen Collected: 02/03/23 10:37 Last Resulted: 02/08/23 15:06     FROILAN, IFA None Detected None Detected            RAPID3 Composite Score = MDHAQ (0-10) + Patient pain VAS (0-10): + Patient global assessment VAS (0-10):     5/9/23 --- RAPID 3: 7.7 + 8 + 9.5 = 25.2    7/11/23  --- RAPID 3: 3 + 6 + 6 = 15    10/26/23  --- RAPID 3: 3.3 + 6 + 6 = 15.3    3/20/24  --- RAPID 3: 3.3 + 8.5 + 7.5 = 19.3    11/18/24  --- RAPID 3: 3.7 + 6 + 6 = 15.7    3/4/25  --- RAPID 3: 2.7 + 3.5 + 6 = 12.2     Remission: <3  Low Disease Activity: <6  Moderate Disease Activity: >=6 and <=12  High Disease Activity: >12      IMPRESSION/RECOMMENDATIONS:      1. Seropositive rheumatoid arthritis (+RF, +CCP): well controlled at this time with current medication regimen.  -- continue Humira 40 mg SQ every 2 weeks  -- MTX and SSZ are C/I due to elevated LFTs.    2. Generalized myalgia. Improved after starting Effexor  -- continue Effexor 75 mg daily.     RTC in 4 months      Orders Placed This Encounter   Procedures    CBC with Auto Differential     Standing Status:   Future     Standing Expiration Date:   9/4/2025    Sedimentation Rate     Standing Status:   Future     Standing Expiration Date:   9/4/2025    C-Reactive Protein     Standing Status:   Future     Standing Expiration Date:   9/4/2025    Vitamin D 25 Hydroxy     Standing Status:   Future     Standing Expiration Date:   9/4/2025    Comprehensive Metabolic Panel     Standing Status:   Future     Standing Expiration Date:   3/4/2026          Safia Kramer MD    [facility] RHEUMATOLOGY  [address] [id number] [phone]

## 2025-03-19 ENCOUNTER — RESULTS FOLLOW-UP (OUTPATIENT)
Age: 65
End: 2025-03-19

## 2025-05-07 DIAGNOSIS — M79.10 MYALGIA: ICD-10-CM

## 2025-05-07 RX ORDER — VENLAFAXINE HYDROCHLORIDE 75 MG/1
CAPSULE, EXTENDED RELEASE ORAL DAILY
Qty: 90 CAPSULE | Refills: 1 | Status: SHIPPED | OUTPATIENT
Start: 2025-05-07

## 2025-07-10 ENCOUNTER — OFFICE VISIT (OUTPATIENT)
Age: 65
End: 2025-07-10
Payer: COMMERCIAL

## 2025-07-10 VITALS
HEIGHT: 65 IN | BODY MASS INDEX: 35.29 KG/M2 | OXYGEN SATURATION: 91 % | HEART RATE: 72 BPM | WEIGHT: 211.8 LBS | DIASTOLIC BLOOD PRESSURE: 84 MMHG | SYSTOLIC BLOOD PRESSURE: 126 MMHG

## 2025-07-10 DIAGNOSIS — R06.02 SHORTNESS OF BREATH: ICD-10-CM

## 2025-07-10 DIAGNOSIS — M05.9 SEROPOSITIVE RHEUMATOID ARTHRITIS (HCC): Primary | ICD-10-CM

## 2025-07-10 PROCEDURE — 99213 OFFICE O/P EST LOW 20 MIN: CPT | Performed by: INTERNAL MEDICINE

## 2025-07-10 PROCEDURE — 99214 OFFICE O/P EST MOD 30 MIN: CPT | Performed by: INTERNAL MEDICINE

## 2025-07-10 RX ORDER — PREDNISONE 10 MG/1
TABLET ORAL
Qty: 30 TABLET | Refills: 0 | Status: SHIPPED | OUTPATIENT
Start: 2025-07-10 | End: 2025-07-20

## 2025-07-10 ASSESSMENT — JOINT PAIN
TOTAL NUMBER OF TENDER JOINTS: 0
TOTAL NUMBER OF SWOLLEN JOINTS: 0

## 2025-07-10 ASSESSMENT — ENCOUNTER SYMPTOMS
NAUSEA: 0
COUGH: 1
SHORTNESS OF BREATH: 1
VOMITING: 0
ABDOMINAL PAIN: 0

## 2025-07-10 NOTE — PROGRESS NOTES
[facility] Physicians   Rheumatology Clinic Note      7/10/2025       CHIEF COMPLAINT:    Chief Complaint   Patient presents with    Follow-up     4 month follow up Seropositive rheumatoid arthritis (HCC)    Other     She states that she stopped taking Humira due to the cost with Medicare.  She has noticed improvement since being off of Humira.            HISTORY OF PRESENT ILLNESS:    65 y.o. female with seropositive rheumatoid arthritis (+RF, +CCP) presents for follow up. Presently, she is on Humira 40 mg SQ every 2 weeks (started May 2023).     Past med: leflunomide (elevated LFTs), hydroxychloroquine (rash/ineffective)  Did not tolerated duloxetine due to GI side effects.    Last Humira dose was 3 months ago. Reports that once she switched insurance to Medicare, Humira copay was over $1,000 and she could not afford that. She stopped taking Humira.    Reports that she has not experienced joint pain, swelling or stiffness. The myalgias and fatigue improved after stopping Humira.     Her main concern is SOB for past 3 months. This is associated with wheezing. Has cough productive of whitish sputum. No fevers or chills.   X-smoker, quit 3 years ago. Has COPD, and has been taking her breathing treatments regularly.    Had cardiac work up. Recent cardiac cath was normal.      Underwent right foot reconstruction surgery Oct 4th for vulgus deformities of the toes.  Had left foot \"rheumatoid reconstruction\" vulgus deformities of toes -- surgery for deviation of toes 5/24/2024.      Past Medical History:     has a past medical history of Arthritis.    Past Surgical History:     has a past surgical history that includes knee surgery; Foot surgery (Left); and Foot surgery (Right, 10/04/2024).    Current Medications:      Current Outpatient Medications:     predniSONE (DELTASONE) 10 MG tablet, Take 5 tablets by mouth daily for 2 days, THEN 4 tablets daily for 2 days, THEN 3 tablets daily for 2 days, THEN 2 tablets daily

## 2025-07-14 DIAGNOSIS — R06.02 SHORTNESS OF BREATH: ICD-10-CM

## 2025-07-15 ENCOUNTER — TELEPHONE (OUTPATIENT)
Age: 65
End: 2025-07-15

## 2025-07-15 NOTE — TELEPHONE ENCOUNTER
P.A. not required for CT chest to be complete at [facility].  Order faxed for them to call patient to schedule.

## 2025-07-23 ENCOUNTER — RESULTS FOLLOW-UP (OUTPATIENT)
Age: 65
End: 2025-07-23

## 2025-07-23 DIAGNOSIS — R06.02 SHORTNESS OF BREATH: ICD-10-CM

## 2025-07-23 NOTE — TELEPHONE ENCOUNTER
Please inform Elvira that CT chest came back normal. There is no evidence of inflammation in the lungs. At this time, I suspect her Shortness of breath is related to COPD. I strongly advise that she discusses her SOB with her primary care provider to her her breathing treatments adjusted.